# Patient Record
Sex: FEMALE | Race: AMERICAN INDIAN OR ALASKA NATIVE
[De-identification: names, ages, dates, MRNs, and addresses within clinical notes are randomized per-mention and may not be internally consistent; named-entity substitution may affect disease eponyms.]

---

## 2018-02-06 ENCOUNTER — HOSPITAL ENCOUNTER (EMERGENCY)
Dept: HOSPITAL 31 - C.ER | Age: 68
Discharge: HOME | End: 2018-02-06
Payer: MEDICARE

## 2018-02-06 VITALS — RESPIRATION RATE: 20 BRPM

## 2018-02-06 VITALS — OXYGEN SATURATION: 98 %

## 2018-02-06 VITALS — BODY MASS INDEX: 36.5 KG/M2

## 2018-02-06 VITALS — DIASTOLIC BLOOD PRESSURE: 102 MMHG | HEART RATE: 72 BPM | SYSTOLIC BLOOD PRESSURE: 181 MMHG | TEMPERATURE: 98.5 F

## 2018-02-06 DIAGNOSIS — I10: Primary | ICD-10-CM

## 2018-02-06 LAB
ALBUMIN SERPL-MCNC: 4 G/DL (ref 3.5–5)
ALBUMIN/GLOB SERPL: 1.1 {RATIO} (ref 1–2.1)
ALT SERPL-CCNC: 30 U/L (ref 9–52)
AST SERPL-CCNC: 26 U/L (ref 14–36)
BASOPHILS # BLD AUTO: 0.1 K/UL (ref 0–0.2)
BASOPHILS NFR BLD: 1.4 % (ref 0–2)
BUN SERPL-MCNC: 12 MG/DL (ref 7–17)
CALCIUM SERPL-MCNC: 8.8 MG/DL (ref 8.6–10.4)
EOSINOPHIL # BLD AUTO: 0.1 K/UL (ref 0–0.7)
EOSINOPHIL NFR BLD: 1.7 % (ref 0–4)
ERYTHROCYTE [DISTWIDTH] IN BLOOD BY AUTOMATED COUNT: 13 % (ref 11.5–14.5)
GFR NON-AFRICAN AMERICAN: > 60
HGB BLD-MCNC: 14.1 G/DL (ref 11–16)
LYMPHOCYTES # BLD AUTO: 2.7 K/UL (ref 1–4.3)
LYMPHOCYTES NFR BLD AUTO: 33.5 % (ref 20–40)
MCH RBC QN AUTO: 31.4 PG (ref 27–31)
MCHC RBC AUTO-ENTMCNC: 35.2 G/DL (ref 33–37)
MCV RBC AUTO: 89.3 FL (ref 81–99)
MONOCYTES # BLD: 0.5 K/UL (ref 0–0.8)
MONOCYTES NFR BLD: 5.9 % (ref 0–10)
NEUTROPHILS # BLD: 4.6 K/UL (ref 1.8–7)
NEUTROPHILS NFR BLD AUTO: 57.5 % (ref 50–75)
NRBC BLD AUTO-RTO: 0 % (ref 0–2)
PLATELET # BLD: 227 K/UL (ref 130–400)
PMV BLD AUTO: 8.8 FL (ref 7.2–11.7)
RBC # BLD AUTO: 4.47 MIL/UL (ref 3.8–5.2)
WBC # BLD AUTO: 8.1 K/UL (ref 4.8–10.8)

## 2018-02-06 NOTE — C.PDOC
History Of Present Illness


68yo female with history of hypertension, diabetes , who has been non-compliant 

with blood pressure medication for the past 4 years and recently has re-started 

on diabetes medication. She was at her GI doctor's office for clearance for a 

colonoscopy scheduled next week and was noted to have marked elevated blood 

pressure, with systolic in the 200's. Patient was sent to ER for further 

evaluation. She denies any headache, chest pain, shortness of breath and offers 

no other medical complaints. 


Time Seen by Provider: 18 13:47


Chief Complaint (Nursing): High Blood Pressure


History Per: Patient


History/Exam Limitations: no limitations


Current Symptoms Are (Timing): Still Present


Associated Symptoms: denies: Chest Pain, Headache





Past Medical History


Reviewed: Historical Data, Nursing Documentation, Vital Signs


Vital Signs: 


 Last Vital Signs











Temp  98.5 F   18 16:40


 


Pulse  72   18 16:40


 


Resp  20   18 16:40


 


BP  181/102 H  18 16:40


 


Pulse Ox  98   18 05:18














- Medical History


PMH: Diabetes, HTN


Surgical History: No Surg Hx


Family History: States: No Known Family Hx





- Social History


Hx Alcohol Use: No


Hx Substance Use: No





- Immunization History


Hx Tetanus Toxoid Vaccination: No


Hx Influenza Vaccination: No


Hx Pneumococcal Vaccination: No





Review Of Systems


Cardiovascular: Positive for: Other (elevated blood pressure).  Negative for: 

Chest Pain, Light Headedness


Respiratory: Negative for: Shortness of Breath


Neurological: Negative for: Headache





Physical Exam





- Physical Exam


Appears: Non-toxic


Skin: Warm, Dry


Head: Atraumatic, Normacephalic


Eye(s): bilateral: Normal Inspection


Neck: Supple


Chest: Symmetrical


Cardiovascular: Rhythm Regular, No Murmur


Respiratory: No Decreased Breath Sounds, No Wheezing


Neurological/Psych: Oriented x3, Normal Speech, Normal Cognition, Normal Motor, 

Normal Sensation





ED Course And Treatment





- Laboratory Results


Result Diagrams: 


 18 14:48





 18 14:48


ECG: Interpreted By Me, Viewed By Me


Interpretation Of ECG: Normal sinus rhtyhm. Moderate voltage criteria for LVH, 

may be normal variant.


Rate From EC


O2 Sat by Pulse Oximetry: 98 (RA)


Pulse Ox Interpretation: Normal





Medical Decision Making


Medical Decision Making: 


Impression: Hypertension


Plan:


-- Discussed with patient regarding importance of appropriate blood pressure. 

Informed that she will not be able to get anesthesia due to an elevated blood 

pressure during surgery. Pt is asymptomatic, no headache, no dizziness. no 

blurred vision. 





-- EKG


-- CMP


-- CBC





420 pm  discussed with pt's pmd. Dr Roger; she 3wants pt started on valsartan 

160 mg  qd and f/u next week with her in office.  





525 pm  pt asymptomatic;  will not give any medications for bp here in ed, to 

be discharged with valsartan as per pmd.  





Disposition


Discussed With Dr.: Leslie Roger


Doctor Will See Patient In The: Office


Counseled Patient/Family Regarding: Studies Performed, Diagnosis, Need For 

Followup, Rx Given





- Disposition


Referrals: 


Leslie Roger MD [Staff Provider] - 


Disposition: HOME/ ROUTINE


Disposition Time: 17:27


Condition: STABLE


Additional Instructions: 


Please take medication as prescribed. Follow up with Dr Roger in a few days 

to re-evaluate blood pressure. Take medicine at same time every day.  Show Dr Roger your cxr report for further outpatient follow up. 


Prescriptions: 


Valsartan [Diovan] 160 mg PO DAILY #30 tab


Instructions:  Hypertension (ED)


Forms:  [facility] (English), General Discharge Instructions





- Clinical Impression


Clinical Impression: 


 Hypertension








- PA / NP / Resident Statement


MD/DO has reviewed & agrees with the documentation as recorded.





- Scribe Statement


The provider has reviewed the documentation as recorded by the Scribe (Marlee Davidson)


Provider Scribe Attestation:


All medical record entries made by the Scribe were at my direction and 

personally dictated by me. I have reviewed the chart and agree that the record 

accurately reflects my personal performance of the history, physical exam, 

medical decision making, and the department course for this patient. I have 

also personally directed, reviewed, and agree with the discharge instructions 

and disposition.

## 2018-02-06 NOTE — RAD
HISTORY:

elevated bp  



COMPARISON:

None available. 



TECHNIQUE:

Chest PA and lateral



FINDINGS:

Examination limited by habitus.



LUNGS:

No focal consolidation.



Please note that chest x-ray has limited sensitivity for the 

detection of pulmonary masses.



PLEURA:

No significant pleural effusion identified. No definite pneumothorax .



CARDIOVASCULAR:

Prominent mediastinum possibly related to ectatic aorta.  Heart size 

appears within normal limits. 



OSSEOUS STRUCTURES:

 Degenerative changes of the spine including confluent anterior 

osteophyte formation.



VISUALIZED UPPER ABDOMEN:

Unremarkable.



OTHER FINDINGS:

None.



IMPRESSION:

Prominent mediastinum may be secondary to ectatic aorta.  Correlate 

clinically.

## 2018-02-08 NOTE — CARD
--------------- APPROVED REPORT --------------





EKG Measurement

Heart [id number]

[id number]

[id number]

[id number]

[id number]



<Conclusion>

Normal sinus rhythm

Moderate voltage criteria for LVH, may be normal variant

Borderline ECG

## 2018-02-14 ENCOUNTER — HOSPITAL ENCOUNTER (OUTPATIENT)
Dept: HOSPITAL 31 - C.ENDO | Age: 68
Discharge: HOME | End: 2018-02-14
Attending: INTERNAL MEDICINE
Payer: MEDICARE

## 2018-02-14 VITALS — OXYGEN SATURATION: 100 % | TEMPERATURE: 98 F

## 2018-02-14 VITALS — RESPIRATION RATE: 12 BRPM

## 2018-02-14 VITALS — BODY MASS INDEX: 36.5 KG/M2

## 2018-02-14 VITALS — HEART RATE: 66 BPM

## 2018-02-14 VITALS — DIASTOLIC BLOOD PRESSURE: 75 MMHG | SYSTOLIC BLOOD PRESSURE: 146 MMHG

## 2018-02-14 DIAGNOSIS — Z12.11: Primary | ICD-10-CM

## 2018-02-14 DIAGNOSIS — K64.8: ICD-10-CM

## 2018-02-14 DIAGNOSIS — Z79.82: ICD-10-CM

## 2018-02-14 DIAGNOSIS — K59.00: ICD-10-CM

## 2018-02-14 DIAGNOSIS — I10: ICD-10-CM

## 2018-02-14 DIAGNOSIS — E11.9: ICD-10-CM

## 2018-02-14 DIAGNOSIS — Z79.84: ICD-10-CM

## 2018-02-14 DIAGNOSIS — Z79.899: ICD-10-CM

## 2018-02-14 DIAGNOSIS — D12.3: ICD-10-CM

## 2018-02-14 PROCEDURE — 82948 REAGENT STRIP/BLOOD GLUCOSE: CPT

## 2018-02-14 PROCEDURE — 45380 COLONOSCOPY AND BIOPSY: CPT

## 2018-02-14 PROCEDURE — 88305 TISSUE EXAM BY PATHOLOGIST: CPT
